# Patient Record
Sex: FEMALE | Employment: FULL TIME | URBAN - NONMETROPOLITAN AREA
[De-identification: names, ages, dates, MRNs, and addresses within clinical notes are randomized per-mention and may not be internally consistent; named-entity substitution may affect disease eponyms.]

---

## 2017-01-09 ENCOUNTER — OFFICE VISIT (OUTPATIENT)
Dept: CARDIOLOGY | Age: 47
End: 2017-01-09

## 2017-01-09 ENCOUNTER — OFFICE VISIT (OUTPATIENT)
Dept: ORTHOPEDIC SURGERY | Age: 47
End: 2017-01-09

## 2017-01-09 VITALS
HEART RATE: 82 BPM | HEIGHT: 60 IN | WEIGHT: 120 LBS | DIASTOLIC BLOOD PRESSURE: 64 MMHG | SYSTOLIC BLOOD PRESSURE: 104 MMHG | BODY MASS INDEX: 23.56 KG/M2

## 2017-01-09 VITALS
BODY MASS INDEX: 24.73 KG/M2 | HEIGHT: 61 IN | SYSTOLIC BLOOD PRESSURE: 108 MMHG | HEART RATE: 94 BPM | DIASTOLIC BLOOD PRESSURE: 56 MMHG | WEIGHT: 131 LBS

## 2017-01-09 DIAGNOSIS — S82.042A CLOSED DISPLACED COMMINUTED FRACTURE OF LEFT PATELLA, INITIAL ENCOUNTER: Primary | ICD-10-CM

## 2017-01-09 DIAGNOSIS — I25.119 CORONARY ARTERY DISEASE INVOLVING NATIVE CORONARY ARTERY OF NATIVE HEART WITH ANGINA PECTORIS (HCC): Primary | ICD-10-CM

## 2017-01-09 PROCEDURE — 99202 OFFICE O/P NEW SF 15 MIN: CPT | Performed by: PHYSICIAN ASSISTANT

## 2017-01-09 PROCEDURE — 99213 OFFICE O/P EST LOW 20 MIN: CPT | Performed by: INTERNAL MEDICINE

## 2017-01-09 RX ORDER — HYDROCODONE BITARTRATE AND ACETAMINOPHEN 5; 325 MG/1; MG/1
1-2 TABLET ORAL EVERY 6 HOURS PRN
Qty: 50 TABLET | Refills: 0 | Status: ON HOLD | OUTPATIENT
Start: 2017-01-09 | End: 2017-01-16 | Stop reason: HOSPADM

## 2017-01-16 PROBLEM — S82.042A CLOSED DISPLACED COMMINUTED FRACTURE OF LEFT PATELLA: Status: ACTIVE | Noted: 2017-01-16

## 2017-01-30 ENCOUNTER — OFFICE VISIT (OUTPATIENT)
Dept: ORTHOPEDIC SURGERY | Age: 47
End: 2017-01-30

## 2017-01-30 VITALS
SYSTOLIC BLOOD PRESSURE: 105 MMHG | WEIGHT: 131 LBS | DIASTOLIC BLOOD PRESSURE: 68 MMHG | HEART RATE: 78 BPM | BODY MASS INDEX: 24.73 KG/M2 | HEIGHT: 61 IN

## 2017-01-30 DIAGNOSIS — S82.042D CLOSED DISPLACED COMMINUTED FRACTURE OF LEFT PATELLA WITH ROUTINE HEALING, SUBSEQUENT ENCOUNTER: Primary | ICD-10-CM

## 2017-01-30 PROCEDURE — 99024 POSTOP FOLLOW-UP VISIT: CPT | Performed by: PHYSICIAN ASSISTANT

## 2017-01-30 RX ORDER — HYDROCODONE BITARTRATE AND ACETAMINOPHEN 5; 325 MG/1; MG/1
1 TABLET ORAL EVERY 6 HOURS PRN
Qty: 50 TABLET | Refills: 0 | Status: SHIPPED | OUTPATIENT
Start: 2017-01-30 | End: 2017-02-06

## 2017-02-17 RX ORDER — ATORVASTATIN CALCIUM 20 MG/1
20 TABLET, FILM COATED ORAL DAILY
Qty: 30 TABLET | Refills: 5 | Status: SHIPPED | OUTPATIENT
Start: 2017-02-17 | End: 2017-07-03 | Stop reason: SDUPTHER

## 2017-02-27 ENCOUNTER — OFFICE VISIT (OUTPATIENT)
Dept: ORTHOPEDIC SURGERY | Age: 47
End: 2017-02-27

## 2017-02-27 VITALS
WEIGHT: 131 LBS | HEIGHT: 60 IN | SYSTOLIC BLOOD PRESSURE: 110 MMHG | BODY MASS INDEX: 25.72 KG/M2 | HEART RATE: 68 BPM | DIASTOLIC BLOOD PRESSURE: 68 MMHG

## 2017-02-27 DIAGNOSIS — S82.042S CLOSED DISPLACED COMMINUTED FRACTURE OF LEFT PATELLA, SEQUELA: Primary | ICD-10-CM

## 2017-02-27 PROCEDURE — 99024 POSTOP FOLLOW-UP VISIT: CPT | Performed by: PHYSICIAN ASSISTANT

## 2017-02-27 RX ORDER — HYDROCODONE BITARTRATE AND ACETAMINOPHEN 5; 325 MG/1; MG/1
TABLET ORAL
COMMUNITY
Start: 2017-02-04 | End: 2017-05-02 | Stop reason: ALTCHOICE

## 2017-03-20 ENCOUNTER — OFFICE VISIT (OUTPATIENT)
Dept: ORTHOPEDIC SURGERY | Age: 47
End: 2017-03-20

## 2017-03-20 VITALS
WEIGHT: 131 LBS | HEART RATE: 72 BPM | DIASTOLIC BLOOD PRESSURE: 64 MMHG | HEIGHT: 60 IN | SYSTOLIC BLOOD PRESSURE: 110 MMHG | BODY MASS INDEX: 25.72 KG/M2

## 2017-03-20 DIAGNOSIS — S82.042S CLOSED DISPLACED COMMINUTED FRACTURE OF LEFT PATELLA, SEQUELA: Primary | ICD-10-CM

## 2017-03-20 PROCEDURE — 99024 POSTOP FOLLOW-UP VISIT: CPT | Performed by: PHYSICIAN ASSISTANT

## 2017-05-02 ENCOUNTER — OFFICE VISIT (OUTPATIENT)
Dept: ORTHOPEDIC SURGERY | Age: 47
End: 2017-05-02

## 2017-05-02 VITALS
WEIGHT: 131 LBS | BODY MASS INDEX: 24.73 KG/M2 | HEIGHT: 61 IN | HEART RATE: 80 BPM | SYSTOLIC BLOOD PRESSURE: 110 MMHG | DIASTOLIC BLOOD PRESSURE: 64 MMHG

## 2017-05-02 DIAGNOSIS — S82.042S CLOSED DISPLACED COMMINUTED FRACTURE OF LEFT PATELLA, SEQUELA: Primary | ICD-10-CM

## 2017-05-02 PROCEDURE — 99024 POSTOP FOLLOW-UP VISIT: CPT | Performed by: PHYSICIAN ASSISTANT

## 2017-07-03 RX ORDER — ATORVASTATIN CALCIUM 20 MG/1
20 TABLET, FILM COATED ORAL DAILY
Qty: 30 TABLET | Refills: 6 | Status: SHIPPED | OUTPATIENT
Start: 2017-07-03 | End: 2018-04-08 | Stop reason: SDUPTHER

## 2017-07-20 ENCOUNTER — OFFICE VISIT (OUTPATIENT)
Dept: CARDIOLOGY CLINIC | Age: 47
End: 2017-07-20
Payer: COMMERCIAL

## 2017-07-20 VITALS
BODY MASS INDEX: 22.47 KG/M2 | DIASTOLIC BLOOD PRESSURE: 58 MMHG | WEIGHT: 119 LBS | SYSTOLIC BLOOD PRESSURE: 95 MMHG | HEIGHT: 61 IN | HEART RATE: 80 BPM

## 2017-07-20 DIAGNOSIS — E78.2 MIXED HYPERLIPIDEMIA: ICD-10-CM

## 2017-07-20 DIAGNOSIS — I34.0 MILD MITRAL REGURGITATION: ICD-10-CM

## 2017-07-20 DIAGNOSIS — I25.5 ISCHEMIC CARDIOMYOPATHY: ICD-10-CM

## 2017-07-20 DIAGNOSIS — I25.119 CORONARY ARTERY DISEASE INVOLVING NATIVE CORONARY ARTERY OF NATIVE HEART WITH ANGINA PECTORIS (HCC): Primary | ICD-10-CM

## 2017-07-20 DIAGNOSIS — I95.0 IDIOPATHIC HYPOTENSION: ICD-10-CM

## 2017-07-20 DIAGNOSIS — Z95.5 STENTED CORONARY ARTERY: ICD-10-CM

## 2017-07-20 PROCEDURE — 99213 OFFICE O/P EST LOW 20 MIN: CPT | Performed by: INTERNAL MEDICINE

## 2017-07-20 PROCEDURE — 1036F TOBACCO NON-USER: CPT | Performed by: INTERNAL MEDICINE

## 2017-07-20 PROCEDURE — G8598 ASA/ANTIPLAT THER USED: HCPCS | Performed by: INTERNAL MEDICINE

## 2017-07-20 PROCEDURE — G8427 DOCREV CUR MEDS BY ELIG CLIN: HCPCS | Performed by: INTERNAL MEDICINE

## 2017-07-20 PROCEDURE — G8420 CALC BMI NORM PARAMETERS: HCPCS | Performed by: INTERNAL MEDICINE

## 2017-07-20 RX ORDER — NITROGLYCERIN 0.4 MG/1
0.4 TABLET SUBLINGUAL EVERY 5 MIN PRN
Qty: 25 TABLET | Refills: 1 | Status: SHIPPED | OUTPATIENT
Start: 2017-07-20 | End: 2019-08-08 | Stop reason: SDUPTHER

## 2018-04-09 RX ORDER — ATORVASTATIN CALCIUM 20 MG/1
TABLET, FILM COATED ORAL
Qty: 30 TABLET | Refills: 4 | Status: SHIPPED | OUTPATIENT
Start: 2018-04-09 | End: 2018-07-13 | Stop reason: SDUPTHER

## 2018-07-13 ENCOUNTER — TELEPHONE (OUTPATIENT)
Dept: CARDIOLOGY | Age: 48
End: 2018-07-13

## 2018-07-13 RX ORDER — ATORVASTATIN CALCIUM 20 MG/1
TABLET, FILM COATED ORAL
Qty: 90 TABLET | Refills: 3 | Status: SHIPPED | OUTPATIENT
Start: 2018-07-13 | End: 2019-09-16 | Stop reason: SDUPTHER

## 2018-07-13 NOTE — TELEPHONE ENCOUNTER
Pt calling to ask for new prescription for her lopressor and lipitor. Pt states she lost them and thinks there were thrown in the trash.

## 2018-08-09 ENCOUNTER — OFFICE VISIT (OUTPATIENT)
Dept: CARDIOLOGY CLINIC | Age: 48
End: 2018-08-09
Payer: COMMERCIAL

## 2018-08-09 VITALS
HEIGHT: 61 IN | WEIGHT: 125 LBS | SYSTOLIC BLOOD PRESSURE: 102 MMHG | HEART RATE: 64 BPM | DIASTOLIC BLOOD PRESSURE: 60 MMHG | BODY MASS INDEX: 23.6 KG/M2

## 2018-08-09 DIAGNOSIS — I25.119 CORONARY ARTERY DISEASE INVOLVING NATIVE CORONARY ARTERY OF NATIVE HEART WITH ANGINA PECTORIS (HCC): Primary | ICD-10-CM

## 2018-08-09 DIAGNOSIS — I34.0 MILD MITRAL REGURGITATION: ICD-10-CM

## 2018-08-09 DIAGNOSIS — I25.5 ISCHEMIC CARDIOMYOPATHY: ICD-10-CM

## 2018-08-09 DIAGNOSIS — Z95.5 STENTED CORONARY ARTERY: ICD-10-CM

## 2018-08-09 DIAGNOSIS — E78.2 MIXED HYPERLIPIDEMIA: ICD-10-CM

## 2018-08-09 DIAGNOSIS — I95.0 IDIOPATHIC HYPOTENSION: ICD-10-CM

## 2018-08-09 PROCEDURE — 99213 OFFICE O/P EST LOW 20 MIN: CPT | Performed by: INTERNAL MEDICINE

## 2018-08-09 NOTE — PROGRESS NOTES
auscultation bilaterally  Heart: regular rate and rhythm, S1, S2 normal, no murmur, click, rub or gallop  Abdomen: soft, non-tender; bowel sounds normal; no masses,  no organomegaly  Extremities: extremities normal, atraumatic, no cyanosis or edema  Neurologic: Mental status: Alert, oriented, thought content appropriate      EKG: Normal Sinus Rhythm with no ischemic changes. LAST ECHO:    LAST STRESS:    LAST CATH:    Past Medical and Surgical History, Problem List, Allergies, Medications, Labs, Imaging, all reviewed extensively in EMR and with the patient. Patient Active Problem List   Diagnosis    Coronary artery disease involving native coronary artery of native heart with angina pectoris ([facility] [address]    Ischemic cardiomyopathy    Mild mitral regurgitation    Hypotension    Mixed hyperlipidemia    Stented coronary artery    Closed displaced comminuted fracture of left patella       Assessment and Plan:    1. Anterior stemi on 5/2015 with 2 ASA to LAD, mid and distal)  2. LV EF recovered post Stemi  3. Mild MR- asymptomatic, follow with annual/biannual echoes  4. CAD- Chronic. LDL goal < 70. Check lipids, ck, cmp every 6 months and optimize medical therapy. 5. Hypotension-stable  6. GERD  7. Ischemic cardiomyopathy- recovered  8. Former Tobacco abuse. Quit at time of STEMI. Chronic. Discussed extensively  9. Hyperlipidemia- Chronic. As above. LDL goal < 70. Optimize therapy. Last LDL 47. Cont meds. Check now. 10. Excellent functional capacity  11. Counseled to be compliant with all physician visits, physician recommendations, and medication compliance. 12. Discussed medications and side effects extensively. 13. Answered all questions. 14. Educational materials provided. 15. Follow up appointments set. Thank you for allowing me to participate in the care of this patient, please do not hesitate to call if you have any questions.     Yesenia Cevallos, [address] [facility]

## 2018-09-25 LAB
CHOLESTEROL/HDL RELATIVE RISK: 2.08
CHOLESTEROL: 108 MG/DL (ref 100–200)
HDLC SERPL-MCNC: 52 MG/DL (ref 45–60)
LDL CHOLESTEROL: 45 MG/DL (ref 88–198)
TRIGL SERPL-MCNC: 56 MG/DL
VLDLC SERPL CALC-MCNC: 11 MG/DL (ref 5–35)

## 2018-11-09 ENCOUNTER — TELEPHONE (OUTPATIENT)
Dept: CARDIOLOGY CLINIC | Age: 48
End: 2018-11-09

## 2019-08-08 ENCOUNTER — OFFICE VISIT (OUTPATIENT)
Dept: CARDIOLOGY CLINIC | Age: 49
End: 2019-08-08
Payer: COMMERCIAL

## 2019-08-08 VITALS
HEIGHT: 61 IN | HEART RATE: 70 BPM | BODY MASS INDEX: 22.84 KG/M2 | WEIGHT: 121 LBS | DIASTOLIC BLOOD PRESSURE: 60 MMHG | SYSTOLIC BLOOD PRESSURE: 100 MMHG

## 2019-08-08 DIAGNOSIS — Z98.61 S/P PERCUTANEOUS TRANSLUMINAL CORONARY ANGIOPLASTY: ICD-10-CM

## 2019-08-08 DIAGNOSIS — E78.2 MIXED HYPERLIPIDEMIA: ICD-10-CM

## 2019-08-08 DIAGNOSIS — I21.02 ST ELEVATION MYOCARDIAL INFARCTION INVOLVING LEFT ANTERIOR DESCENDING (LAD) CORONARY ARTERY (HCC): ICD-10-CM

## 2019-08-08 DIAGNOSIS — I25.10 CORONARY ARTERY DISEASE INVOLVING NATIVE CORONARY ARTERY OF NATIVE HEART WITHOUT ANGINA PECTORIS: ICD-10-CM

## 2019-08-08 DIAGNOSIS — I10 ESSENTIAL HYPERTENSION: ICD-10-CM

## 2019-08-08 DIAGNOSIS — Z87.891 FORMER TOBACCO USE: Primary | ICD-10-CM

## 2019-08-08 DIAGNOSIS — Z98.890 S/P CARDIAC CATH: ICD-10-CM

## 2019-08-08 PROCEDURE — 99214 OFFICE O/P EST MOD 30 MIN: CPT | Performed by: INTERNAL MEDICINE

## 2019-08-08 PROCEDURE — G8598 ASA/ANTIPLAT THER USED: HCPCS | Performed by: INTERNAL MEDICINE

## 2019-08-08 PROCEDURE — 1036F TOBACCO NON-USER: CPT | Performed by: INTERNAL MEDICINE

## 2019-08-08 PROCEDURE — G8420 CALC BMI NORM PARAMETERS: HCPCS | Performed by: INTERNAL MEDICINE

## 2019-08-08 PROCEDURE — G8427 DOCREV CUR MEDS BY ELIG CLIN: HCPCS | Performed by: INTERNAL MEDICINE

## 2019-08-08 RX ORDER — NITROGLYCERIN 0.4 MG/1
0.4 TABLET SUBLINGUAL EVERY 5 MIN PRN
Qty: 25 TABLET | Refills: 1 | Status: SHIPPED | OUTPATIENT
Start: 2019-08-08

## 2019-09-16 RX ORDER — ATORVASTATIN CALCIUM 20 MG/1
TABLET, FILM COATED ORAL
Qty: 90 TABLET | Refills: 3 | Status: SHIPPED | OUTPATIENT
Start: 2019-09-16 | End: 2020-09-11

## 2019-10-30 LAB
ALBUMIN: 4.1 G/DL (ref 3.5–5.2)
ALP BLD-CCNC: 67 U/L (ref 40–150)
ALT SERPL-CCNC: 26 U/L (ref 0–55)
ANION GAP SERPL CALCULATED.3IONS-SCNC: 9 MEQ/L (ref 5–13)
AST SERPL-CCNC: 27 U/L (ref 5–34)
BILIRUB SERPL-MCNC: 0.4 MG/DL (ref 0–1)
BUN BLDV-MCNC: 13 MG/DL (ref 7–18)
CALCIUM SERPL-MCNC: 9.1 MG/DL (ref 8.4–10.2)
CHLORIDE BLD-SCNC: 109 MMOL/L (ref 98–107)
CHOLESTEROL/HDL RELATIVE RISK: 2.83
CHOLESTEROL: 119 MG/DL
CO2: 25 MMOL/L (ref 22–29)
CREAT SERPL-MCNC: 0.9 MG/DL (ref 0.57–1.11)
GFR CALCULATED: > 60
GLUCOSE: 86 MG/DL (ref 70–99)
HDLC SERPL-MCNC: 42 MG/DL (ref 45–60)
LDL CHOLESTEROL: 59 MG/DL
POTASSIUM SERPL-SCNC: 3.8 MMOL/L (ref 3.5–4.5)
SODIUM BLD-SCNC: 143 MMOL/L (ref 136–145)
TOTAL PROTEIN: 6.5 G/DL (ref 6.4–8.2)
TRIGL SERPL-MCNC: 90 MG/DL
VLDLC SERPL CALC-MCNC: 18 MG/DL (ref 5–35)

## 2019-11-05 ENCOUNTER — TELEPHONE (OUTPATIENT)
Dept: CARDIOLOGY CLINIC | Age: 49
End: 2019-11-05

## 2020-09-11 RX ORDER — ATORVASTATIN CALCIUM 20 MG/1
TABLET, FILM COATED ORAL
Qty: 90 TABLET | Refills: 3 | Status: ON HOLD | OUTPATIENT
Start: 2020-09-11 | End: 2022-10-11 | Stop reason: HOSPADM

## 2022-10-10 ENCOUNTER — HOSPITAL ENCOUNTER (OUTPATIENT)
Dept: CARDIAC CATH/INVASIVE PROCEDURES | Age: 52
Setting detail: OBSERVATION
Discharge: HOME | End: 2022-10-11
Attending: INTERNAL MEDICINE | Admitting: INTERNAL MEDICINE
Payer: COMMERCIAL

## 2022-10-10 DIAGNOSIS — Z95.5 STENTED CORONARY ARTERY: Primary | ICD-10-CM

## 2022-10-10 LAB
ANION GAP SERPL CALCULATED.3IONS-SCNC: 10 MMOL/L (ref 9–17)
BUN BLDV-MCNC: 16 MG/DL (ref 6–20)
BUN/CREAT BLD: 17 (ref 9–20)
CALCIUM SERPL-MCNC: 9.4 MG/DL (ref 8.6–10.4)
CHLORIDE BLD-SCNC: 106 MMOL/L (ref 98–107)
CO2: 24 MMOL/L (ref 20–31)
CREAT SERPL-MCNC: 0.93 MG/DL (ref 0.5–0.9)
GFR SERPL CREATININE-BSD FRML MDRD: >60 ML/MIN/1.73M2
GLUCOSE BLD-MCNC: 88 MG/DL (ref 70–99)
HCT VFR BLD CALC: 43 % (ref 36.3–47.1)
HEMOGLOBIN: 14.2 G/DL (ref 11.9–15.1)
LV EF: 50 %
LVEF MODALITY: NORMAL
MCH RBC QN AUTO: 30.1 PG (ref 25.2–33.5)
MCHC RBC AUTO-ENTMCNC: 33 G/DL (ref 28.4–34.8)
MCV RBC AUTO: 91.1 FL (ref 82.6–102.9)
NRBC AUTOMATED: 0 PER 100 WBC
PDW BLD-RTO: 12.6 % (ref 11.8–14.4)
PLATELET # BLD: 200 K/UL (ref 138–453)
PMV BLD AUTO: 10.1 FL (ref 8.1–13.5)
POTASSIUM SERPL-SCNC: 3.6 MMOL/L (ref 3.7–5.3)
RBC # BLD: 4.72 M/UL (ref 3.95–5.11)
SODIUM BLD-SCNC: 140 MMOL/L (ref 135–144)
WBC # BLD: 6.3 K/UL (ref 3.5–11.3)

## 2022-10-10 PROCEDURE — C1760 CLOSURE DEV, VASC: HCPCS

## 2022-10-10 PROCEDURE — 80048 BASIC METABOLIC PNL TOTAL CA: CPT

## 2022-10-10 PROCEDURE — 6370000000 HC RX 637 (ALT 250 FOR IP): Performed by: INTERNAL MEDICINE

## 2022-10-10 PROCEDURE — 92928 PRQ TCAT PLMT NTRAC ST 1 LES: CPT

## 2022-10-10 PROCEDURE — 6360000004 HC RX CONTRAST MEDICATION

## 2022-10-10 PROCEDURE — G0378 HOSPITAL OBSERVATION PER HR: HCPCS

## 2022-10-10 PROCEDURE — 2580000003 HC RX 258: Performed by: INTERNAL MEDICINE

## 2022-10-10 PROCEDURE — 2500000003 HC RX 250 WO HCPCS

## 2022-10-10 PROCEDURE — 6360000002 HC RX W HCPCS

## 2022-10-10 PROCEDURE — 93458 L HRT ARTERY/VENTRICLE ANGIO: CPT

## 2022-10-10 PROCEDURE — C1894 INTRO/SHEATH, NON-LASER: HCPCS

## 2022-10-10 PROCEDURE — 85027 COMPLETE CBC AUTOMATED: CPT

## 2022-10-10 PROCEDURE — C1725 CATH, TRANSLUMIN NON-LASER: HCPCS

## 2022-10-10 PROCEDURE — C1769 GUIDE WIRE: HCPCS

## 2022-10-10 PROCEDURE — C1874 STENT, COATED/COV W/DEL SYS: HCPCS

## 2022-10-10 PROCEDURE — 2709999900 HC NON-CHARGEABLE SUPPLY

## 2022-10-10 PROCEDURE — C1887 CATHETER, GUIDING: HCPCS

## 2022-10-10 RX ORDER — SODIUM CHLORIDE 0.9 % (FLUSH) 0.9 %
5-40 SYRINGE (ML) INJECTION PRN
Status: DISCONTINUED | OUTPATIENT
Start: 2022-10-10 | End: 2022-10-11 | Stop reason: HOSPADM

## 2022-10-10 RX ORDER — SODIUM CHLORIDE 0.9 % (FLUSH) 0.9 %
5-40 SYRINGE (ML) INJECTION EVERY 12 HOURS SCHEDULED
Status: DISCONTINUED | OUTPATIENT
Start: 2022-10-10 | End: 2022-10-11 | Stop reason: HOSPADM

## 2022-10-10 RX ORDER — SODIUM CHLORIDE 9 MG/ML
INJECTION, SOLUTION INTRAVENOUS PRN
Status: DISCONTINUED | OUTPATIENT
Start: 2022-10-10 | End: 2022-10-11 | Stop reason: HOSPADM

## 2022-10-10 RX ORDER — SODIUM CHLORIDE 9 MG/ML
INJECTION, SOLUTION INTRAVENOUS CONTINUOUS
Status: DISCONTINUED | OUTPATIENT
Start: 2022-10-10 | End: 2022-10-11 | Stop reason: HOSPADM

## 2022-10-10 RX ORDER — ATORVASTATIN CALCIUM 40 MG/1
40 TABLET, FILM COATED ORAL NIGHTLY
Status: DISCONTINUED | OUTPATIENT
Start: 2022-10-10 | End: 2022-10-11 | Stop reason: HOSPADM

## 2022-10-10 RX ORDER — ASPIRIN 81 MG/1
81 TABLET ORAL DAILY
Status: DISCONTINUED | OUTPATIENT
Start: 2022-10-11 | End: 2022-10-11 | Stop reason: HOSPADM

## 2022-10-10 RX ADMIN — SODIUM CHLORIDE, PRESERVATIVE FREE 10 ML: 5 INJECTION INTRAVENOUS at 19:50

## 2022-10-10 RX ADMIN — SODIUM CHLORIDE: 9 INJECTION, SOLUTION INTRAVENOUS at 11:58

## 2022-10-10 RX ADMIN — TICAGRELOR 180 MG: 90 TABLET ORAL at 18:15

## 2022-10-10 RX ADMIN — ATORVASTATIN CALCIUM 40 MG: 40 TABLET, FILM COATED ORAL at 20:57

## 2022-10-10 ASSESSMENT — PAIN - FUNCTIONAL ASSESSMENT: PAIN_FUNCTIONAL_ASSESSMENT: 0-10

## 2022-10-10 NOTE — PROCEDURES
[facility]        Date:   10/10/2022  Patient name: Tereso Jaquez  Date of admission:  10/10/2022 11:25 AM  MRN:   [id number]  YOB: 1970    CARDIAC CATHETERIZATION    Operators:  Primary: Minna Diego MD.  Assistant:     Indications for cath: [place], Abnormal nuclear stress test.    Procedure performed: Cardiac cath. Access: Right Femoral artery      Procedure: After informed consent was obtained with explanation of the risks and benefits, patient was brought to the cath lab. The right groin were prepped and draped in sterile fashion. 1% lidocaine was used for local block. The Femoral artery was cannulated with 6  Fr sheath with brisk arterial blood return. The side port was frequently flushed and aspirated with normal saline. Findings:    LMCA: Normal 0% stenosis. LAD: Single stenosis. with patent proximal, mid and distal stents with mid   in-stent re-stenosis         Lesion on Mid LAD: 90% stenosis 12 mm length reduced to 0%. Pre procedure     ABBY III flow was noted. Post Procedure ABBY III flow was present. Good     runoff was present. The lesion was diagnosed as Moderate Risk (B). Devices used         - Luge Wire 182 cm. Number of passes: 1.         - Euphora Balloon 2.0mm x 20mm. 1 inflation(s)         - Resolute Munir 2.75 x 15 ASA. 1 inflation(s) to a max pressure of: 16     chip. LCx: Normal 0% stenosis. RCA: Normal 0% stenosis. LVEF: 50%. LV Wall Motion: Mild anteroapical hypokinesis. Conclusions:    Patent proximal, mid and distal LAD stents with mid in-stent re-stenosis    underwent ASA. Normal LCX and RCA. Normal LV systolic function. Recommendation:    Routine Post Stent Orders. Medical treatments. Risk factors modifications.         Electronically signed by Houston Cueva MD on 10/10/2022 at 3:22 PM      [facility]  [phone]

## 2022-10-10 NOTE — LETTER
[facility]  [address] [id number]  Phone: [phone]    [facility] CATH LAB RM 1        October 11, 2022     Patient: Stefania Rodríguez   YOB: 1970   Date of Visit: 10/10/2022       To Whom It May Concern: It is my medical opinion that Stefania Rodríguez may return to light duty immediately with the following restrictions: lifting/carrying not to exceed 5 lbs. 10/11/22 afternoon. If you have any questions or concerns, please don't hesitate to call.     Sincerely,        Yesika Sanders, [address] [facility]

## 2022-10-10 NOTE — H&P
Please refer to full H&P from Dr. Geoffrey Bro from cardiology office note at [facility] details on H&P scanned in the chart.     Electronically signed by Laurie Deutsch MD on 10/10/2022 at 2.00 PM      [facility]  [phone]

## 2022-10-11 VITALS
DIASTOLIC BLOOD PRESSURE: 37 MMHG | HEART RATE: 80 BPM | SYSTOLIC BLOOD PRESSURE: 105 MMHG | BODY MASS INDEX: 27.11 KG/M2 | OXYGEN SATURATION: 98 % | HEIGHT: 60 IN | RESPIRATION RATE: 17 BRPM | TEMPERATURE: 97.5 F | WEIGHT: 138.1 LBS

## 2022-10-11 LAB
ANION GAP SERPL CALCULATED.3IONS-SCNC: 12 MMOL/L (ref 9–17)
BUN BLDV-MCNC: 15 MG/DL (ref 6–20)
BUN/CREAT BLD: 15 (ref 9–20)
CALCIUM SERPL-MCNC: 9.1 MG/DL (ref 8.6–10.4)
CHLORIDE BLD-SCNC: 105 MMOL/L (ref 98–107)
CO2: 23 MMOL/L (ref 20–31)
CREAT SERPL-MCNC: 1 MG/DL (ref 0.5–0.9)
GFR SERPL CREATININE-BSD FRML MDRD: >60 ML/MIN/1.73M2
GLUCOSE BLD-MCNC: 89 MG/DL (ref 70–99)
HCT VFR BLD CALC: 39.7 % (ref 36.3–47.1)
HEMOGLOBIN: 13.2 G/DL (ref 11.9–15.1)
MCH RBC QN AUTO: 29.9 PG (ref 25.2–33.5)
MCHC RBC AUTO-ENTMCNC: 33.2 G/DL (ref 28.4–34.8)
MCV RBC AUTO: 89.8 FL (ref 82.6–102.9)
NRBC AUTOMATED: 0 PER 100 WBC
PDW BLD-RTO: 12.6 % (ref 11.8–14.4)
PLATELET # BLD: 181 K/UL (ref 138–453)
PMV BLD AUTO: 10.3 FL (ref 8.1–13.5)
POTASSIUM SERPL-SCNC: 4.1 MMOL/L (ref 3.7–5.3)
RBC # BLD: 4.42 M/UL (ref 3.95–5.11)
SODIUM BLD-SCNC: 140 MMOL/L (ref 135–144)
WBC # BLD: 7.5 K/UL (ref 3.5–11.3)

## 2022-10-11 PROCEDURE — G0378 HOSPITAL OBSERVATION PER HR: HCPCS

## 2022-10-11 PROCEDURE — 6370000000 HC RX 637 (ALT 250 FOR IP): Performed by: INTERNAL MEDICINE

## 2022-10-11 PROCEDURE — 36415 COLL VENOUS BLD VENIPUNCTURE: CPT

## 2022-10-11 PROCEDURE — 2580000003 HC RX 258: Performed by: INTERNAL MEDICINE

## 2022-10-11 PROCEDURE — 80048 BASIC METABOLIC PNL TOTAL CA: CPT

## 2022-10-11 PROCEDURE — 85027 COMPLETE CBC AUTOMATED: CPT

## 2022-10-11 RX ORDER — ATORVASTATIN CALCIUM 40 MG/1
40 TABLET, FILM COATED ORAL NIGHTLY
Qty: 30 TABLET | Refills: 3 | Status: SHIPPED | OUTPATIENT
Start: 2022-10-11

## 2022-10-11 RX ADMIN — TICAGRELOR 90 MG: 90 TABLET ORAL at 07:52

## 2022-10-11 RX ADMIN — SODIUM CHLORIDE, PRESERVATIVE FREE 10 ML: 5 INJECTION INTRAVENOUS at 07:53

## 2022-10-11 RX ADMIN — ASPIRIN 81 MG: 81 TABLET, COATED ORAL at 07:52

## 2022-10-11 NOTE — PROGRESS NOTES
End Of Shift Note  [facility]  Summary of shift: Patient received from Cath Lab w/one stent to the mid-LAD. Staying overnight for observation, home tomorrow. Right groin approach, no redness or hematoma at site. VSS.      Vitals:    Vitals:    10/10/22 2054 10/11/22 0000 10/11/22 0400 10/11/22 0445   BP: (!) 94/42 (!) 91/51 (!) 92/54    Pulse: 96 63 69    Resp:  16 16    Temp:  97.2 °F (36.2 °C) 97.7 °F (36.5 °C)    TempSrc:  Temporal Temporal    SpO2:  100% 98%    Weight:    138 lb 1.6 oz (62.6 kg)   Height:            I&O: No intake or output data in the 24 hours ending 10/11/22 0559    Resp Status: RA    Ventilator Settings:     / / /     Critical Care IV infusions:   sodium chloride 100 mL/hr at 10/10/22 1158    sodium chloride 75 mL/hr at 10/10/22 1814    sodium chloride          LDA:   Peripheral IV 10/10/22 Left;Proximal Forearm (Active)   Number of days: 0

## 2022-10-11 NOTE — PLAN OF CARE
Problem: Discharge Planning  Goal: Discharge to home or other facility with appropriate resources  10/11/2022 0620 by Gabby Fuentes RN  Outcome: Progressing  10/10/2022 1717 by Matias Smith RN  Outcome: Progressing     Problem: Pain  Goal: Verbalizes/displays adequate comfort level or baseline comfort level  10/11/2022 0620 by Gabby Fuentes RN  Outcome: Progressing  10/10/2022 1717 by Matias Smith RN  Outcome: Progressing     Problem: Safety - Adult  Goal: Free from fall injury  10/11/2022 0620 by Gabby Fuentes RN  Outcome: Progressing  10/10/2022 1717 by Matias Smith RN  Outcome: Progressing

## 2022-10-11 NOTE — PROGRESS NOTES
End Of Shift Note  [address] [facility]  Summary of shift: Pt stable w no complaints, see post cath assessment for details on insertion site, has had no complications. Metoprolol not given d/t BP. Home today.     Vitals:    Vitals:    10/10/22 2054 10/11/22 0000 10/11/22 0400 10/11/22 0445   BP: (!) 94/42 (!) 91/51 (!) 92/54    Pulse: 96 63 69    Resp:  16 16    Temp:  97.2 °F (36.2 °C) 97.7 °F (36.5 °C)    TempSrc:  Temporal Temporal    SpO2:  100% 98%    Weight:    138 lb 1.6 oz (62.6 kg)   Height:            I&O: No intake or output data in the 24 hours ending 10/11/22 0613    Resp Status: RA    Ventilator Settings:     / / /     Critical Care IV infusions:   sodium chloride 100 mL/hr at 10/10/22 1158    sodium chloride 75 mL/hr at 10/10/22 1814    sodium chloride          LDA:   Peripheral IV 10/10/22 Left;Proximal Forearm (Active)   Number of days: 0

## 2022-10-11 NOTE — CARE COORDINATION
10/11/22 1004   Service Assessment   Patient Orientation Alert and Oriented;Person;Place;Situation;Self   Cognition Alert   History Provided By Patient;Significant Other   Primary Caregiver Self   Support Systems Spouse/Significant Other   Patient's Healthcare Decision Maker is: Legal Next of Kin   PCP Verified by CM Yes   Last Visit to PCP Within last year   Prior Functional Level Independent in ADLs/IADLs   Current Functional Level Independent in ADLs/IADLs   Can patient return to prior living arrangement Yes   Ability to make needs known: Good   Family able to assist with home care needs: Yes   Would you like for me to discuss the discharge plan with any other family members/significant others, and if so, who? No   Social/Functional History   Lives With Significant other   Type of [address] One level   Bathroom Shower/Tub Tub/Shower unit   Bathroom Accessibility Accessible   Receives Help From Dorothy Ritchie.   Ambulation Assistance Independent   Transfer Assistance Independent   Active  Yes   Mode of Transportation Car   Discharge Planning   Type of [address] Prior To Admission None   Potential Assistance Needed N/A   DME Ordered? No   Potential Assistance Purchasing Medications No   Type of Home Care Services None   Patient expects to be discharged to: House   Follow Up Appointment: Best Day/Time  Monday AM   One/Two Story Residence One story   History of falls? 0   Services At/After Discharge   Transition of Care Consult (CM Consult) Other  (outpatient cardiac rehab; [phone] Discharge None   [employer] Information Provided? No   Mode of Transport at Discharge Other (see comment)  (sig other)   Confirm Follow Up Transport Family   Plan is home independently with sig. Other. Declines any skilled needs. Had 1 stent placed.  Faxed patient info to [place] [phone] cardiac rehab to schedule outpatient rehab. No DME. On Brilinta.

## 2022-10-11 NOTE — PROGRESS NOTES
Discharge Note:     All discharge instructions given at this time as well as all patient belongings returned to patient. Pt denies any further questions regarding discharge. Pt given discharge packet with unit letter, discharge instructions/restrictions and medication handouts regarding all discharge medications and side effects. Pt denies any further issues at this time. Pt refused wheelchair at discharge and requested to walk. Escorted out to front discharge doors and left premises without any issues in private vehicle at this time.

## 2022-10-11 NOTE — DISCHARGE SUMMARY
[facility]  Discharge Note                 Name:  Elijah Keating  YOB: 1970  Social Security Number:  [id number]  Medical Record Number:  [id number]    Date of Admission:  10/10/2022  Date of Discharge:  10/11/2022    Admitting physician: Mine Guajardo MD    Discharge Attending: AIYANA Bonner - CNP, CNP  Primary Care Physician: Julius Mcdonald MD, MD  Consultants: Cardiology  Discharge to [address] [place] [address] LIST:  Patient Active Problem List   Diagnosis    Coronary artery disease involving native coronary artery of native heart with angina pectoris [facility])    Ischemic cardiomyopathy    Mild mitral regurgitation    Hypotension    Mixed hyperlipidemia    Stented coronary artery    Closed displaced comminuted fracture of left patella         Procedures:cardiac catheterization    HOSPITAL COURSE :           The patient was admitted for: Cardiac cath   Hospital Procedures if any: Cardiac cath   Medications changes recommendation:  See lsit   Follow Up Plan:  2 weeks     CARDIAC CATH 10/10/22  Findings:     LMCA: Normal 0% stenosis. LAD: Single stenosis. with patent proximal, mid and distal stents with mid   in-stent re-stenosis         Lesion on Mid LAD: 90% stenosis 12 mm length reduced to 0%. Pre procedure     ABBY III flow was noted. Post Procedure ABBY III flow was present. Good     runoff was present. The lesion was diagnosed as Moderate Risk (B). Devices used         - Luge Wire 182 cm. Number of passes: 1.         - Euphora Balloon 2.0mm x 20mm. 1 inflation(s)         - Resolute Munir 2.75 x 15 ASA. 1 inflation(s) to a max pressure of: 16     chip. LCx: Normal 0% stenosis. RCA: Normal 0% stenosis. LVEF: 50%. LV Wall Motion: Mild anteroapical hypokinesis. Conclusions:     Patent proximal, mid and distal LAD stents with mid in-stent re-stenosis    underwent ASA. Normal LCX and RCA.    Normal LV systolic examined. Pt resting in bed. She denies any chest pain or shortness of breath. Groin site soft. Discussed with patient and nursing. Medications and discharge instructions reviewed with patient and nursing. Pt verbalizes understanding regarding medications and activity restrictions. Script for brilinta  given with discount card. Will follow up in 2 weeks.            Electronically signed by AIYANA Rios CNP on 10/11/2022 at [place] [address] [facility]      [phone]

## 2022-10-11 NOTE — PLAN OF CARE
Problem: Discharge Planning  Goal: Discharge to home or other facility with appropriate resources  10/11/2022 0944 by Whitney Ernandez RN  Outcome: Adequate for Discharge  Flowsheets (Taken 10/11/2022 0800 by Kamla Edmond RN)  Discharge to home or other facility with appropriate resources: Identify barriers to discharge with patient and caregiver  10/11/2022 0620 by Brian Yan RN  Outcome: Progressing     Problem: Pain  Goal: Verbalizes/displays adequate comfort level or baseline comfort level  10/11/2022 0944 by Whitney Ernandez RN  Outcome: Adequate for Discharge  10/11/2022 0620 by Brian Yan RN  Outcome: Progressing     Problem: Safety - Adult  Goal: Free from fall injury  10/11/2022 0944 by Whitney Ernandez RN  Outcome: Adequate for Discharge  10/11/2022 0620 by Brian Yan RN  Outcome: Progressing

## 2023-01-02 LAB
CHOLESTEROL/HDL RATIO: 2.28
CHOLESTEROL: 139 MG/DL
HDLC SERPL-MCNC: 61 MG/DL (ref 45–60)
LDL CHOLESTEROL: 71 MG/DL
TRIGL SERPL-MCNC: 37 MG/DL
VLDLC SERPL CALC-MCNC: 7 MG/DL (ref 5–35)

## 2025-07-17 ENCOUNTER — HOSPITAL ENCOUNTER (OUTPATIENT)
Age: 55
Discharge: HOME | End: 2025-07-17
Payer: COMMERCIAL

## 2025-07-17 DIAGNOSIS — J32.9 RECURRENT SINUSITIS: ICD-10-CM

## 2025-07-17 PROCEDURE — 36415 COLL VENOUS BLD VENIPUNCTURE: CPT

## 2025-07-17 PROCEDURE — 82785 ASSAY OF IGE: CPT

## 2025-07-17 PROCEDURE — 86003 ALLG SPEC IGE CRUDE XTRC EA: CPT

## 2025-07-18 LAB
A ALTERNATA IGE QN: <0.1 KU/L (ref 0–0.34)
A FUMIGATUS IGE QN: <0.1 KU/L (ref 0–0.34)
ALLERGEN BIRCH IGE: <0.1 KU/L (ref 0–0.34)
BERMUDA GRASS IGE QN: <0.1 KU/L (ref 0–0.34)
BOXELDER IGE QN: <0.1 KU/L (ref 0–0.34)
C HERBARUM IGE QN: <0.1 KUL/L (ref 0–0.34)
CALIF WALNUT POLN IGE QN: <0.1 KU/L (ref 0–0.34)
CAT DANDER IGE QN: <0.1 KU/L (ref 0–0.34)
CMN PIGWEED IGE QN: <0.1 KU/L (ref 0–0.34)
COMMON RAGWEED IGE QN: <0.1 KU/L (ref 0–0.34)
COTTONWOOD IGE QN: <0.1 KU/L (ref 0–0.34)
D FARINAE IGE QN: <0.1 KU/L (ref 0–0.34)
D PTERONYSS IGE QN: <0.1 KU/L (ref 0–0.34)
DOG DANDER IGE QN: <0.1 KU/L (ref 0–0.34)
IGE SERPL-ACNC: 26 IU/ML (ref 0–100)
LONDON PLANE IGE QN: <0.1 KU/L (ref 0–0.34)
M RACEMOSUS IGE QN: <0.1 KU/L (ref 0–0.34)
MOUSE EPITH IGE QN: <0.1 KU/L (ref 0–0.34)
MT JUNIPER IGE QN: <0.1 KU/L (ref 0–0.34)
P NOTATUM IGE QN: <0.1 KU/L (ref 0–0.34)
PECAN/HICK TREE IGE QN: <0.1 KU/L (ref 0–0.34)
ROACH IGE QN: <0.1 KU/L (ref 0–0.34)
SALTWORT IGE QN: <0.1 KU/L (ref 0–0.34)
SHEEP SORREL IGE QN: <0.1 KU/L (ref 0–0.34)
TIMOTHY IGE QN: <0.1 KU/L (ref 0–0.34)
WHITE ASH IGE QN: <0.1 KU/L (ref 0–0.34)
WHITE ELM IGE QN: <0.1 KU/L (ref 0–0.34)
WHITE MULBERRY IGE QN: <0.1 KU/L (ref 0–0.34)
WHITE OAK IGE QN: <0.1 KU/L (ref 0–0.34)